# Patient Record
Sex: MALE | Race: WHITE | NOT HISPANIC OR LATINO | Employment: UNEMPLOYED | URBAN - METROPOLITAN AREA
[De-identification: names, ages, dates, MRNs, and addresses within clinical notes are randomized per-mention and may not be internally consistent; named-entity substitution may affect disease eponyms.]

---

## 2023-10-26 ENCOUNTER — OFFICE VISIT (OUTPATIENT)
Dept: SURGERY | Facility: CLINIC | Age: 88
End: 2023-10-26
Payer: MEDICARE

## 2023-10-26 DIAGNOSIS — K43.9 SPIGELIAN HERNIA: ICD-10-CM

## 2023-10-26 LAB
NON-UH HIE BUN: 16 MG/DL (ref 9–23)
NON-UH HIE CREATININE: 1.2 MG/DL (ref 0.6–1.1)
NON-UH HIE GFR AA: >60
NON-UH HIE GLOMERULAR FILTRATION RATE: 57 ML/MIN/1.73M?

## 2023-10-26 PROCEDURE — 1159F MED LIST DOCD IN RCRD: CPT | Performed by: PHYSICIAN ASSISTANT

## 2023-10-26 PROCEDURE — 99203 OFFICE O/P NEW LOW 30 MIN: CPT | Performed by: PHYSICIAN ASSISTANT

## 2023-10-26 RX ORDER — BRIMONIDINE TARTRATE 2 MG/ML
SOLUTION/ DROPS OPHTHALMIC
COMMUNITY
Start: 2014-01-24

## 2023-10-26 RX ORDER — NAPROXEN SODIUM 220 MG/1
TABLET, FILM COATED ORAL
COMMUNITY
Start: 2004-03-01

## 2023-10-26 RX ORDER — ATORVASTATIN CALCIUM 10 MG/1
10 TABLET, FILM COATED ORAL
COMMUNITY
Start: 2020-07-29

## 2023-10-26 RX ORDER — LISINOPRIL 40 MG/1
TABLET ORAL
COMMUNITY
Start: 2023-03-16

## 2023-10-26 RX ORDER — AMLODIPINE BESYLATE 10 MG/1
TABLET ORAL
COMMUNITY
Start: 2023-03-16

## 2023-10-26 RX ORDER — MELOXICAM 15 MG/1
TABLET ORAL
COMMUNITY
Start: 2023-03-16

## 2023-10-26 RX ORDER — HYDROCHLOROTHIAZIDE 25 MG/1
25 TABLET ORAL
COMMUNITY
Start: 2015-10-23

## 2023-10-26 NOTE — PROGRESS NOTES
"Subjective   Patient ID: Wilfrido Hubbard is a 89 y.o. male who presents for New Patient Visit (hernia).    HPI 89-year-old gentleman who states he is here because of a hernia in his \"crotch\".  Patient has a history of prostate cancer had a radical prostatectomy many years ago.  States he does not recall getting any radiation.  States his bump is been down there for several months and has been getting larger and when he coughs it is larger.        Review of Systems  Negative other than mentioned in HPI    ENT: No earache, no sore throat, no nosebleeds  Cardiovascular: No chest pain, no shortness of breath, no leg pain, no edema  Respiratory: No shortness of breath on exertion, no wheezing  Gastrointestinal: No abdominal pain, no melena, no nausea, vomiting and/or diarrhea  Bump in groin  Musculoskeletal: No pain moving all extremities, no back pain ambulating normally  Skin: No rashes, no lesions, and no skin changes  Neuro: No headache, no confusion, no numbness and tingling  Psychiatric, normal mood, not suicidal, not homicidal, feeling good      Physical Exam  Eyes: Conjunctiva non -icteric and eye lids are without obvious rash or drooping. Pupils are symmetric.   Ears, Nose, Mouth, and Throat: External ears and nose appear to be without deformity or rash. No lesions or masses noted. Hearing is grossly intact.   Neck:. No JVD noted, tracheal position is midline. No thyromegaly, no thyroid nodules  Head and Face: Examination of the head and face revealed no abnormalities.   Respiratory: No gasping or shortness of breath noted, no use of accessory muscles noted. Clear to auscultate bilaterally  Cardiovascular: Examination for edema is normal. Regular rate and rhythm S1 S2 without murmurs  GI: Abdomen no tender to palpation, bowel sounds present no hepatosplenomegaly  Inguinal area:.  There are no inguinal hernias both testes down.  There is a hernia above his left inguinal area in the lower left abdominal area and it " is reducible when he coughs it does come out larger.  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   Musk: Digits/nails show no clubbing or cyanosis. No asymmetry or masses noted of the musculature. Examination of the muscles/joints/bones show normal range of motion. Gait is grossly normally.   Neurologic: Cranial nerves II- XII intact, motor strength 5/5 muscle strength of the lower extremities bilaterally and equal.      Objective     No diagnosis found.   There is no problem list on file for this patient.     No Known Allergies   Medication Documentation Review Audit       Reviewed by Samantha Monterroso MA (Medical Assistant) on 10/26/23 at 1132      Medication Order Taking? Sig Documenting Provider Last Dose Status   amLODIPine (Norvasc) 10 mg tablet [id number] Yes  Historical Provider, MD Taking Active   aspirin 81 mg chewable tablet [id number] Yes 1 daily Historical Provider, MD Taking Active   atorvastatin (Lipitor) 10 mg tablet [id number] Yes 1 tablet (10 mg). Historical Provider, MD Taking Active   brimonidine (AlphaGAN) 0.2 % ophthalmic solution [id number] Yes INSTILL 1 DROP IN EACH EYE TWICE DAILY (GENERIC SUBSTITUTION MADE FOR ALPHAGAN) Historical Provider, MD Taking Active   hydroCHLOROthiazide (HYDRODiuril) 25 mg tablet [id number] Yes Take 1 tablet (25 mg) by mouth once daily. Historical Provider, MD Taking Active   lisinopril 40 mg tablet [id number] Yes  Historical Provider, MD Taking Active   meloxicam (Mobic) 15 mg tablet [id number] Yes  Historical Provider, MD Taking Active                    History reviewed. No pertinent past medical history.  Social History     Tobacco Use   Smoking Status Not on file   Smokeless Tobacco Not on file     No family history on file.   History reviewed. No pertinent surgical history.    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   Patient should return to the office in 2 weeks to have a CT review and discussion of possible surgery.    Encounter Diagnosis   Name Primary?    Spigelian hernia      I have reviewed all data including labs,radiologic and previous reports.      **Portions of this medical record have been created using voice recognition software and may have minor errors which are inherent in voice recognition systems. It has not been fully edited for typographical or grammatical errors**

## 2023-12-18 ENCOUNTER — OFFICE VISIT (OUTPATIENT)
Dept: SURGERY | Facility: CLINIC | Age: 88
End: 2023-12-18
Payer: MEDICARE

## 2023-12-18 DIAGNOSIS — K43.9 SPIGELIAN HERNIA: Primary | ICD-10-CM

## 2023-12-18 PROCEDURE — 1160F RVW MEDS BY RX/DR IN RCRD: CPT | Performed by: SURGERY

## 2023-12-18 PROCEDURE — 1159F MED LIST DOCD IN RCRD: CPT | Performed by: SURGERY

## 2023-12-18 PROCEDURE — 99214 OFFICE O/P EST MOD 30 MIN: CPT | Performed by: SURGERY

## 2023-12-18 NOTE — PROGRESS NOTES
Subjective   Patient ID: Wilfrido Hubbard is a 89 y.o. male who presents for Follow-up (CT scan results).  Complaints and the palpable nonreducible lump in the left lower quadrant.    HPI patient has this lump for quite long period of time.  Patient had a previous radical prostatectomy.  Recently patient underwent CT scan of abdomen pelvis for assessment of the integrity of abdominal wall  Review of Systems GI system consistent with nonreducible lump in the left lower quadrant.  Review of all other 10 system is negative.  Physical Exam presacral bilaterally, mucosa moist, bilateral breath sounds, clear to auscultation, regular rhythm and rate, abdomen soft, nontender, scar from midline lower laparotomy.  Palpable nonreducible lump in the left lateral abdominal wall, lateral to the inguinal crease.  Size 3.6 cm no palpable other hernias.  Palpable peripheral pulses, no focal neurological motor deficits.  Musculoskeletal exam within normal limits, and exam within normal limits    Objective I reviewed all available data including lab results, radiological studies, previous reports and notes.  CT scan consistent with multiple kidney cysts.  Official report mention left inguinal hernia, which I did not appreciate in the physical exam.  Patient has the lateral abdominal wall hernia which was not described.  Part of the sigmoid colon entered and there is a hernia in this area most likely represent spigelian hernia.      No diagnosis found.   There is no problem list on file for this patient.     No Known Allergies   Medication Documentation Review Audit       Reviewed by Samantha Monterroso MA (Medical Assistant) on 12/18/23 at 1443      Medication Order Taking? Sig Documenting Provider Last Dose Status   amLODIPine (Norvasc) 10 mg tablet [id number] Yes  Historical Provider, MD Taking Active   aspirin 81 mg chewable tablet [id number] Yes 1 daily Historical Provider, MD Taking Active   atorvastatin (Lipitor) 10 mg tablet [id number]  Yes 1 tablet (10 mg). Historical Provider, MD Taking Active   brimonidine (AlphaGAN) 0.2 % ophthalmic solution [id number] Yes INSTILL 1 DROP IN EACH EYE TWICE DAILY (GENERIC SUBSTITUTION MADE FOR ALPHAGAN) Historical Provider, MD Taking Active   hydroCHLOROthiazide (HYDRODiuril) 25 mg tablet [id number] Yes Take 1 tablet (25 mg) by mouth once daily. Historical Provider, MD Taking Active   lisinopril 40 mg tablet [id number] Yes  Historical Provider, MD Taking Active   meloxicam (Mobic) 15 mg tablet [id number] Yes  Historical Provider, MD Taking Active                    History reviewed. No pertinent past medical history.  Social History     Tobacco Use   Smoking Status Unknown   Smokeless Tobacco Not on file     No family history on file.   History reviewed. No pertinent surgical history.    Assessment/Plan     The patient with a left-sided spigelian hernia.  Risk of strangulation of the sigmoid colon.  The patient has indication for open left spigelian hernia repair.  Risks, benefits, alternative treatment were explained to the patient.  All questions were answered.  Informed consent was obtained.    Sidney Mendes MD

## 2024-01-23 ENCOUNTER — OFFICE VISIT (OUTPATIENT)
Dept: SURGERY | Facility: CLINIC | Age: 89
End: 2024-01-23
Payer: MEDICARE

## 2024-01-23 DIAGNOSIS — Z09 S/P SPIGELIAN HERNIA REPAIR, FOLLOW-UP EXAM: Primary | ICD-10-CM

## 2024-01-23 PROCEDURE — 99024 POSTOP FOLLOW-UP VISIT: CPT | Performed by: PHYSICIAN ASSISTANT

## 2024-01-23 PROCEDURE — 1160F RVW MEDS BY RX/DR IN RCRD: CPT | Performed by: PHYSICIAN ASSISTANT

## 2024-01-23 PROCEDURE — 1159F MED LIST DOCD IN RCRD: CPT | Performed by: PHYSICIAN ASSISTANT

## 2024-01-23 NOTE — PROGRESS NOTES
Subjective   Patient ID: Wilfrido Hubbard is a 89 y.o. male who presents for Post-op (Open incarcerated left inguinal hernia repair done on 01/09/24).    HPI  89-year-old gentleman who on January 9, 2024 underwent a open left inguinal and spigelian hernia repair.  Patient is doing well he has no complaints he is eating he is drinking he is moving his bowels he drove himself here today.  He has no nausea no vomiting.  He states he had some bruising initially after the surgery that is since resolved.      Review of Systems  Negative other than mentioned in HPI    ENT: No earache, no sore throat, no nosebleeds  Cardiovascular: No chest pain, no shortness of breath, no leg pain, no edema  Respiratory: No shortness of breath on exertion, no wheezing  Gastrointestinal: No abdominal pain, no melena, no nausea, vomiting and/or diarrhea  Musculoskeletal: No pain moving all extremities, no back pain ambulating normally  Skin: No rashes, no lesions, and no skin changes  Neuro: No headache, no confusion, no numbness and tingling  Psychiatric, normal mood, not suicidal, not homicidal, feeling good        Physical Exam  Eyes: Conjunctiva non -icteric and eye lids are without obvious rash or drooping. Pupils are symmetric.   Ears, Nose, Mouth, and Throat: External ears and nose appear to be without deformity or rash. No lesions or masses noted. Hearing is grossly intact.   Neck:. No JVD noted, tracheal position is midline. No thyromegaly, no thyroid nodules  Head and Face: Examination of the head and face revealed no abnormalities.   Respiratory: No gasping or shortness of breath noted, no use of accessory muscles noted. Clear to auscultate bilaterally  Cardiovascular: Examination for edema is normal. Regular rate and rhythm S1 S2 without murmurs  GI: Abdomen non tender to palpation, bowel sounds present no hepatosplenomegaly  Inguinal:.  Patient's incision is in the left inguinal area that is well-approximated and closed with glue.   The majority of the glue was removed here in the office incision is clean dry and intact.  Skin: No rashes or open lesions/ulcers identified on skin.   Musk: Digits/nails show no clubbing or cyanosis. No asymmetry or masses noted of the musculature. Examination of the muscles/joints/bones show normal range of motion. Gait is grossly normally.   Neurologic: Cranial nerves II- XII intact, motor strength 5/5 muscle strength of the lower extremities bilaterally and equal.    Objective     No diagnosis found.   Patient Active Problem List   Diagnosis    Spigelian hernia      No Known Allergies   Medication Documentation Review Audit       Reviewed by Samantha Monterroso MA (Medical Assistant) on 01/23/24 at 1108      Medication Order Taking? Sig Documenting Provider Last Dose Status   amLODIPine (Norvasc) 10 mg tablet [id number] No  Historical Provider, MD Taking Active   aspirin 81 mg chewable tablet [id number] No 1 daily Historical Provider, MD Taking Active   atorvastatin (Lipitor) 10 mg tablet [id number] No 1 tablet (10 mg). Historical Provider, MD Taking Active   brimonidine (AlphaGAN) 0.2 % ophthalmic solution [id number] No INSTILL 1 DROP IN EACH EYE TWICE DAILY (GENERIC SUBSTITUTION MADE FOR ALPHAGAN) Historical Provider, MD Taking Active   hydroCHLOROthiazide (HYDRODiuril) 25 mg tablet [id number] No Take 1 tablet (25 mg) by mouth once daily. Historical Provider, MD Taking Active   lisinopril 40 mg tablet [id number] No  Historical Provider, MD Taking Active   meloxicam (Mobic) 15 mg tablet [id number] No  Historical Provider, MD Taking Active                    No past medical history on file.  Social History     Tobacco Use   Smoking Status Unknown   Smokeless Tobacco Not on file     No family history on file.   No past surgical history on file.    Assessment/Plan   No lifting over 10 to 12 pounds for 4 weeks  No swimming pools hot tubs or lakes for 2 weeks  You may ride a stationary bike, you may use a treadmill, you may  walk outside.  No squats, sit ups or lunges  You may drive a car  Follow-up as needed     Encounter Diagnosis   Name Primary?    S/P spigelian hernia repair, follow-up exam Yes     I have reviewed all data including labs,radiologic and previous reports.      **Portions of this medical record have been created using voice recognition software and may have minor errors which are inherent in voice recognition systems. It has not been fully edited for typographical or grammatical errors**